# Patient Record
Sex: FEMALE | Race: AMERICAN INDIAN OR ALASKA NATIVE
[De-identification: names, ages, dates, MRNs, and addresses within clinical notes are randomized per-mention and may not be internally consistent; named-entity substitution may affect disease eponyms.]

---

## 2017-01-07 ENCOUNTER — HOSPITAL ENCOUNTER (EMERGENCY)
Dept: HOSPITAL 5 - ED | Age: 82
Discharge: HOME | End: 2017-01-07
Payer: MEDICARE

## 2017-01-07 VITALS — SYSTOLIC BLOOD PRESSURE: 122 MMHG | DIASTOLIC BLOOD PRESSURE: 63 MMHG

## 2017-01-07 DIAGNOSIS — I10: ICD-10-CM

## 2017-01-07 DIAGNOSIS — H40.9: ICD-10-CM

## 2017-01-07 DIAGNOSIS — Z88.6: ICD-10-CM

## 2017-01-07 DIAGNOSIS — M19.90: ICD-10-CM

## 2017-01-07 DIAGNOSIS — N28.9: ICD-10-CM

## 2017-01-07 DIAGNOSIS — H66.91: Primary | ICD-10-CM

## 2017-01-07 PROCEDURE — 99283 EMERGENCY DEPT VISIT LOW MDM: CPT

## 2017-01-07 NOTE — EMERGENCY DEPARTMENT REPORT
ED ENT HPI





- General


Chief complaint: Dental/Oral


Stated complaint: R EAR PAIN


Time Seen by Provider: 17 07:20


Source: patient, EMS


Mode of arrival: Stretcher


Limitations: No Limitations





- History of Present Illness


Initial comments: 


84F multiple medical problems hypertension, anxiety presents with complaint of 

right ear pain since yesterday.  Patient complaining of sensation of pressure 

in the ear.  Denies any fever chills nausea or vomiting.  Because patient is 

elderly and stated she could not get a ride she called EMS to come to hospital 

for treatment.  States she has had ear infections and issues with her TMJ joint 

in the past.  States she has not put anything inside of her ear lately.


MD complaint: ear pain


Onset/Timin


-: days(s)


Location: R ear


Severity: moderate


Severity scale (0 -10): 7


Quality: aching





- Related Data


 Home Medications











 Medication  Instructions  Recorded  Confirmed  Last Taken


 


ALPRAZolam [Xanax TAB] 1 mg PO BID 03/04/15 08/21/16 Unknown


 


Latanoprost 0.005% [Xalatan 0.005%] 1 drop OP QPM 03/04/15 08/21/16 Unknown


 


Levothyroxine [Synthroid] 125 mcg PO QAM 03/04/15 08/21/16 Unknown


 


Lisinopril [Zestril] 10 mg PO QDAY 03/04/15 08/21/16 Unknown


 


Pantoprazole [Protonix] 40 mg PO QDAY 03/04/15 08/21/16 Unknown


 


Sennosides [Senna Laxative] 8.6 mg PO QDAY 03/04/15 08/21/16 Unknown


 


Simvastatin 20 mg PO QHS 03/04/15 08/21/16 Unknown


 


traMADol [Ultram] 50 mg PO Q8HR PRN 03/04/15 08/21/16 Unknown


 


Baclofen [Lioresal] 10 mg PO BID 16





    1000


 


HYDROcodone/APAP  [Norco 1 each PO Q6HR PRN 16 10:

00





10/325]    








 Previous Rx's











 Medication  Instructions  Recorded  Last Taken  Type


 


Ketorolac [Toradol] 10 mg PO Q6H PRN #12 tablet 03/04/15 Unknown Rx


 


Meclizine [Antivert] 25 mg PO TID PRN #30 tablet 03/04/15 Unknown Rx


 


Acetaminophen [Acetaminophen TAB] 500 mg PO Q6HR PRN #25 tablet 17 

Unknown Rx


 


Amoxicillin [Amoxicillin TAB] 875 mg PO BID #14 tablet 17 Unknown Rx


 


Neomy/Polymyx B/Hc (Otic) Soln 4 drops OTIC TID #1 bottle 17 Unknown Rx





[Cortisporin (Otic) Soln]    


 


traMADol [Ultram] 50 mg PO Q6HR PRN #10 tablet 17 Unknown Rx











 Allergies











Allergy/AdvReac Type Severity Reaction Status Date / Time


 


codeine Allergy  Vomiting Verified 10/25/14 05:57














ED Dental HPI





- General


Chief complaint: Dental/Oral


Stated complaint: R EAR PAIN


Time Seen by Provider: 17 07:20


Source: patient, EMS


Mode of arrival: Stretcher


Limitations: No Limitations





- Related Data


 Home Medications











 Medication  Instructions  Recorded  Confirmed  Last Taken


 


ALPRAZolam [Xanax TAB] 1 mg PO BID 03/04/15 08/21/16 Unknown


 


Latanoprost 0.005% [Xalatan 0.005%] 1 drop OP QPM 03/04/15 08/21/16 Unknown


 


Levothyroxine [Synthroid] 125 mcg PO QAM 03/04/15 08/21/16 Unknown


 


Lisinopril [Zestril] 10 mg PO QDAY 03/04/15 08/21/16 Unknown


 


Pantoprazole [Protonix] 40 mg PO QDAY 03/04/15 08/21/16 Unknown


 


Sennosides [Senna Laxative] 8.6 mg PO QDAY 03/04/15 08/21/16 Unknown


 


Simvastatin 20 mg PO QHS 03/04/15 08/21/16 Unknown


 


traMADol [Ultram] 50 mg PO Q8HR PRN 03/04/15 08/21/16 Unknown


 


Baclofen [Lioresal] 10 mg PO BID 16





    1000


 


HYDROcodone/APAP  [Norco 1 each PO Q6HR PRN 16 10:

00





10/325]    








 Previous Rx's











 Medication  Instructions  Recorded  Last Taken  Type


 


Ketorolac [Toradol] 10 mg PO Q6H PRN #12 tablet 03/04/15 Unknown Rx


 


Meclizine [Antivert] 25 mg PO TID PRN #30 tablet 03/04/15 Unknown Rx


 


Acetaminophen [Acetaminophen TAB] 500 mg PO Q6HR PRN #25 tablet 17 

Unknown Rx


 


Amoxicillin [Amoxicillin TAB] 875 mg PO BID #14 tablet 17 Unknown Rx


 


Neomy/Polymyx B/Hc (Otic) Soln 4 drops OTIC TID #1 bottle 17 Unknown Rx





[Cortisporin (Otic) Soln]    


 


traMADol [Ultram] 50 mg PO Q6HR PRN #10 tablet 17 Unknown Rx











 Allergies











Allergy/AdvReac Type Severity Reaction Status Date / Time


 


codeine Allergy  Vomiting Verified 10/25/14 05:57














ED Review of Systems


ROS: 


Stated complaint: R EAR PAIN


Other details as noted in HPI





ENT: ear pain (2 days.  Pain)





ED Past Medical Hx





- Past Medical History


Previous Medical History?: Yes


Hx Hypertension: Yes


Hx Renal Disease: Yes (renal insufficiency)


Hx Arthritis: Yes


Additional medical history: glucoma.  TMJ pain





- Surgical History


Past Surgical History?: Yes


Additional Surgical History: HIATAL HERNIA, BILATERAL KNEE SURGERY





- Social History


Smoking Status: Never Smoker


Substance Use Type: None





- Medications


Home Medications: 


 Home Medications











 Medication  Instructions  Recorded  Confirmed  Last Taken  Type


 


ALPRAZolam [Xanax TAB] 1 mg PO BID 03/04/15 08/21/16 Unknown History


 


Ketorolac [Toradol] 10 mg PO Q6H PRN #12 tablet 03/04/15 08/21/16 Unknown Rx


 


Latanoprost 0.005% [Xalatan 0.005%] 1 drop OP QPM 03/04/15 08/21/16 Unknown 

History


 


Levothyroxine [Synthroid] 125 mcg PO QAM 03/04/15 08/21/16 Unknown History


 


Lisinopril [Zestril] 10 mg PO QDAY 03/04/15 08/21/16 Unknown History


 


Meclizine [Antivert] 25 mg PO TID PRN #30 tablet 03/04/15 08/21/16 Unknown Rx


 


Pantoprazole [Protonix] 40 mg PO QDAY 03/04/15 08/21/16 Unknown History


 


Sennosides [Senna Laxative] 8.6 mg PO QDAY 03/04/15 08/21/16 Unknown History


 


Simvastatin 20 mg PO QHS 03/04/15 08/21/16 Unknown History


 


traMADol [Ultram] 50 mg PO Q8HR PRN 03/04/15 08/21/16 Unknown History


 


Baclofen [Lioresal] 10 mg PO BID 16 History





    1000 


 


HYDROcodone/APAP  [Norco 1 each PO Q6HR PRN 16 10:

00 History





10/325]     


 


Acetaminophen [Acetaminophen TAB] 500 mg PO Q6HR PRN #25 tablet 17  

Unknown Rx


 


Amoxicillin [Amoxicillin TAB] 875 mg PO BID #14 tablet 17  Unknown Rx


 


Neomy/Polymyx B/Hc (Otic) Soln 4 drops OTIC TID #1 bottle 17  Unknown Rx





[Cortisporin (Otic) Soln]     


 


traMADol [Ultram] 50 mg PO Q6HR PRN #10 tablet 17  Unknown Rx














ED Physical Exam





- General


Limitations: No Limitations


General appearance: alert, in no apparent distress





- Head


Head exam: Present: atraumatic, normocephalic





- Eye


Eye exam: Present: normal appearance, PERRL, EOMI





- ENT


ENT exam: Present: mucous membranes moist, other (right tympanic membrane 

bulging, injected, no visible effusion behind it.  Tympanic membrane intact and 

no perforations, no foreign bodies minor amount of dried wax in the canal, 

minor external canal erythema.  No pus.  Hearing intact, no reproducible TMJ 

tenderness on clinical palpation.  No mastoid tenderness bilaterally on deep 

palpation either)





- Neck


Neck exam: Present: normal inspection





- Respiratory


Respiratory exam: Present: normal lung sounds bilaterally.  Absent: respiratory 

distress





- Cardiovascular


Cardiovascular Exam: Present: regular rate, normal rhythm.  Absent: systolic 

murmur, diastolic murmur, rubs, gallop





- GI/Abdominal


GI/Abdominal exam: Present: soft, normal bowel sounds





- Extremities Exam


Extremities exam: Present: normal inspection





- Back Exam


Back exam: Present: normal inspection





- Neurological Exam


Neurological exam: Present: alert, oriented X3





- Psychiatric


Psychiatric exam: Present: normal affect, normal mood





- Skin


Skin exam: Present: warm, dry, intact, normal color.  Absent: rash





ED Course


 Vital Signs











  17





  06:47


 


Temperature 98.1 F


 


Pulse Rate 97 H


 


Respiratory 20





Rate 


 


Blood Pressure 122/63





[Left] 


 


O2 Sat by Pulse 97





Oximetry 














ED Medical Decision Making





- Medical Decision Making


A/P: Acute otitis media right ear


1-Tylenol 500 mg when necessary, Ultram for severe pain PRN, amoxicillin 875 

twice a day 7days


2- for relief Corticosporin eardrops


3- follow-up with primary care doctor


 


Critical care attestation.: 


If time is entered above; I have spent that time in minutes in the direct care 

of this critically ill patient, excluding procedure time.








ED Disposition


Clinical Impression: 


 Earache on right





Acute otitis media


Qualifiers:


 Laterality: right Recurrence: recurrent Spontaneous tympanic membrane rupture: 

without spontaneous rupture 


Disposition: DISCHARGED TO HOME OR SELFCARE


Is pt being admited?: No


Does the pt Need Aspirin: No


Condition: Stable


Instructions:  Earache (ED), Anesthetics (Into the ear), Otitis Media (ED)


Prescriptions: 


Acetaminophen [Acetaminophen TAB] 500 mg PO Q6HR PRN #25 tablet


 PRN Reason: Pain


Amoxicillin [Amoxicillin TAB] 875 mg PO BID #14 tablet


Neomy/Polymyx B/Hc (Otic) Soln [Cortisporin (Otic) Soln] 4 drops OTIC TID #1 

bottle


traMADol [Ultram] 50 mg PO Q6HR PRN #10 tablet


 PRN Reason: Pain


Referrals: 


PRIMARY CARE,MD [Primary Care Provider] - 3-5 Days


WILLIAM HELLER MD [Staff Physician] - 3-5 Days


Time of Disposition: 07:48

## 2017-01-09 ENCOUNTER — HOSPITAL ENCOUNTER (EMERGENCY)
Dept: HOSPITAL 5 - ED | Age: 82
Discharge: HOME | End: 2017-01-09
Payer: MEDICARE

## 2017-01-09 VITALS — SYSTOLIC BLOOD PRESSURE: 153 MMHG | DIASTOLIC BLOOD PRESSURE: 74 MMHG

## 2017-01-09 DIAGNOSIS — H92.01: Primary | ICD-10-CM

## 2017-01-09 DIAGNOSIS — I10: ICD-10-CM

## 2017-01-09 DIAGNOSIS — N28.9: ICD-10-CM

## 2017-01-09 DIAGNOSIS — M19.90: ICD-10-CM

## 2017-01-09 PROCEDURE — 99282 EMERGENCY DEPT VISIT SF MDM: CPT

## 2017-01-09 PROCEDURE — 96372 THER/PROPH/DIAG INJ SC/IM: CPT

## 2017-01-09 NOTE — EMERGENCY DEPARTMENT REPORT
HPI





- General


Chief Complaint: Earache


Time Seen by Provider: 01/09/17 01:58





- HPI


HPI: 





84 year old femalewith  multiple medical problems hypertension, anxiety 

presents with complaint of right ear pain x 3 days.  Patient complaining of 

sensation of pressure in the ear.  Denies any fever chills nausea or vomiting.  

Patient was seen here yesterday and was prescribed Tramadol, Amoxicillin and 

Neomycin/Polymyx B otic drops. Patient denies symptomatic relief. States she 

has had ear infections and issues with her TMJ joint in the past.  States she 

is compliant with antibiotic use. Denies fever, chills, nausea, vomiting, chest 

pain, shortness of breath, abdominal pain.





ED Past Medical Hx





- Past Medical History


Previous Medical History?: Yes


Hx Hypertension: Yes


Hx Renal Disease: Yes (renal insufficiency)


Hx Arthritis: Yes


Additional medical history: glucoma.  TMJ pain





- Surgical History


Past Surgical History?: Yes


Additional Surgical History: HIATAL HERNIA, BILATERAL KNEE SURGERY





- Social History


Smoking Status: Never Smoker


Substance Use Type: None





- Medications


Home Medications: 


 Home Medications











 Medication  Instructions  Recorded  Confirmed  Last Taken  Type


 


ALPRAZolam [Xanax TAB] 1 mg PO BID 03/04/15 01/09/17 Unknown History


 


Latanoprost 0.005% [Xalatan 0.005%] 1 drop OP QPM 03/04/15 01/09/17 Unknown 

History


 


Levothyroxine [Synthroid] 125 mcg PO QAM 03/04/15 01/09/17 Unknown History


 


Pantoprazole [Protonix] 40 mg PO QDAY 03/04/15 01/09/17 Unknown History


 


Sennosides [Senna Laxative] 8.6 mg PO QDAY 03/04/15 01/09/17 Unknown History


 


Simvastatin 20 mg PO QHS 03/04/15 01/09/17 Unknown History


 


traMADol [Ultram] 50 mg PO Q8HR PRN 03/04/15 01/09/17 Unknown History


 


HYDROcodone/APAP  [Norco 1 each PO Q6HR PRN 08/21/16 01/09/17 08/20/16 10:

00 History





10/325]     


 


Acetaminophen [Acetaminophen TAB] 500 mg PO Q6HR PRN #25 tablet 01/07/17 01/09/ 17 Unknown Rx


 


Ketorolac [Toradol] 10 mg PO Q6H PRN #20 tablet 01/09/17  Unknown Rx


 


Losartan [Cozaar] 12.5 mg PO QDAY 01/09/17 01/09/17 Unknown History














ED Review of Systems


ROS: 


Stated complaint: RT EAR PAIN


Other details as noted in HPI





Constitutional: denies: chills, fever, malaise


Eyes: denies: eye pain


ENT: ear pain.  denies: throat pain, congestion


Respiratory: denies: cough, shortness of breath, wheezing


Cardiovascular: denies: chest pain, palpitations


Endocrine: no symptoms reported


Gastrointestinal: denies: abdominal pain, nausea, vomiting


Skin: denies: rash


Neurological: denies: headache, weakness, numbness, paresthesias





Physical Exam





- Physical Exam


Vital Signs: 


 Vital Signs











  01/09/17 01/09/17





  00:45 02:19


 


Temperature 98.3 F 


 


Pulse Rate 97 H 


 


Respiratory 20 20





Rate  


 


Blood Pressure 153/74 


 


Blood Pressure 153/74 





[Right]  


 


O2 Sat by Pulse 98 





Oximetry  











Physical Exam: 





GENERAL: The patient is well-developed and well-nourished. Patient is in NAD. 





HEAD: Normocephalic.  Atraumatic.  No tenderness to palpation of the TM joint.


EYES:  PERRL.


EARS: Left External auditory canals and tympanic membranes clear.  Mildly 

erythematous right external auditory canal with clear tympanic membrane. 

Negative tug test.  No tenderness to palpation of the mastoid region.


NOSE:  Normal nasal mucosa with no nasal discharge.


THROAT: No erythema, swelling or exudates.  





NECK: Supple, nontender, without lymphadenopathy.  





CHEST/LUNGS: Clear to auscultation throughout. 





HEART/CARDIOVASCULAR: Regular rate and rhythm.  No murmurs, rubs or gallops.





ABDOMEN: Abdomen is soft, nontender. Bowel sounds normoactive. No guarding or 

rebound tenderness. 





EXTREMITIES: Peripheral pulses intact. Capillary refill less than 2 seconds. 








ED Course


 Vital Signs











  01/09/17 01/09/17





  00:45 02:19


 


Temperature 98.3 F 


 


Pulse Rate 97 H 


 


Respiratory 20 20





Rate  


 


Blood Pressure 153/74 


 


Blood Pressure 153/74 





[Right]  


 


O2 Sat by Pulse 98 





Oximetry  














ED Medical Decision Making





- Lab Data





 Vital Signs











  01/09/17 01/09/17





  00:45 02:19


 


Temperature 98.3 F 


 


Pulse Rate 97 H 


 


Respiratory 20 20





Rate  


 


Blood Pressure 153/74 


 


Blood Pressure 153/74 





[Right]  


 


O2 Sat by Pulse 98 





Oximetry  














- Medical Decision Making





84-year-old female presents today with ear pain 3 days.  Patient was seen here 

yesterday and states her antibiotics are not working.  Explained to patient 

that the medication takes time and she is recommended to continue her 

antibiotic use.  Patient was given Toradol shot and reported symptomatic 

relief. Patient is in no acute distress at this time.  She will be discharged 

home and is encouraged to follow up with a primary care provider.  She will be 

sent home on Toradol and is encouraged to return to the emergency room for any 

worsening symptoms. 


Critical care attestation.: 


If time is entered above; I have spent that time in minutes in the direct care 

of this critically ill patient, excluding procedure time.








ED Disposition


Clinical Impression: 


 Earache on right


Disposition: DISCHARGED TO HOME OR SELFCARE


Is pt being admited?: No


Does the pt Need Aspirin: No


Condition: Stable


Instructions:  Earache (ED), Otitis Externa (ED), Otitis Media (ED)


Additional Instructions: 


Follow-up with primary care provider.  Return to the emergency department if 

symptoms worsen.


Prescriptions: 


Ketorolac [Toradol] 10 mg PO Q6H PRN #20 tablet


 PRN Reason: Pain


Referrals: 


PRIMARY CARE,MD [Primary Care Provider] - 3-5 Days


WILLIAM HELLER MD [Staff Physician] - 3-5 Days


Forms:  Work/School Release Form(ED)


Time of Disposition: 03:19

## 2017-05-11 ENCOUNTER — HOSPITAL ENCOUNTER (OUTPATIENT)
Dept: HOSPITAL 5 - OR | Age: 82
Discharge: HOME | End: 2017-05-11
Attending: OPHTHALMOLOGY
Payer: MEDICARE

## 2017-05-11 VITALS — SYSTOLIC BLOOD PRESSURE: 148 MMHG | DIASTOLIC BLOOD PRESSURE: 75 MMHG

## 2017-05-11 DIAGNOSIS — H26.9: Primary | ICD-10-CM

## 2017-05-11 DIAGNOSIS — M19.90: ICD-10-CM

## 2017-05-11 DIAGNOSIS — H40.9: ICD-10-CM

## 2017-05-11 DIAGNOSIS — F41.9: ICD-10-CM

## 2017-05-11 DIAGNOSIS — I10: ICD-10-CM

## 2017-05-11 PROCEDURE — 66984 XCAPSL CTRC RMVL W/O ECP: CPT

## 2017-05-11 PROCEDURE — V2632 POST CHMBR INTRAOCULAR LENS: HCPCS

## 2017-05-11 RX ADMIN — TROPICAMIDE SCH DROPS: 10 SOLUTION/ DROPS OPHTHALMIC at 07:35

## 2017-05-11 RX ADMIN — MOXIFLOXACIN SCH DROPS: 5 SOLUTION/ DROPS OPHTHALMIC at 07:30

## 2017-05-11 RX ADMIN — PREDNISOLONE ACETATE SCH DROPS: 10 SUSPENSION/ DROPS OPHTHALMIC at 10:20

## 2017-05-11 RX ADMIN — TROPICAMIDE SCH DROPS: 10 SOLUTION/ DROPS OPHTHALMIC at 07:30

## 2017-05-11 RX ADMIN — PHENYLEPHRINE HYDROCHLORIDE SCH DROPS: 25 SOLUTION/ DROPS OPHTHALMIC at 07:40

## 2017-05-11 RX ADMIN — MOXIFLOXACIN SCH DROPS: 5 SOLUTION/ DROPS OPHTHALMIC at 07:40

## 2017-05-11 RX ADMIN — PHENYLEPHRINE HYDROCHLORIDE SCH DROPS: 25 SOLUTION/ DROPS OPHTHALMIC at 07:30

## 2017-05-11 RX ADMIN — MOXIFLOXACIN SCH DROPS: 5 SOLUTION/ DROPS OPHTHALMIC at 07:35

## 2017-05-11 RX ADMIN — TROPICAMIDE SCH DROPS: 10 SOLUTION/ DROPS OPHTHALMIC at 07:40

## 2017-05-11 RX ADMIN — PREDNISOLONE ACETATE SCH DROPS: 10 SUSPENSION/ DROPS OPHTHALMIC at 09:30

## 2017-05-11 RX ADMIN — PHENYLEPHRINE HYDROCHLORIDE SCH DROPS: 25 SOLUTION/ DROPS OPHTHALMIC at 07:35

## 2017-05-11 NOTE — SHORT STAY SUMMARY
Short Stay Documentation


Date of service: 05/11/17





- History


H&P: obtained from office





- Allergies and Medications


Current Medications: 


 Allergies





codeine Adverse Reaction (Verified 05/09/17 15:23)


 Vomiting





 Home Medications











 Medication  Instructions  Recorded  Confirmed  Last Taken  Type


 


ALPRAZolam [Xanax TAB] 1 mg PO BID 03/04/15 01/09/17 Unknown History


 


Latanoprost 0.005% [Xalatan 0.005%] 1 drop OP QPM 03/04/15 01/09/17 Unknown 

History


 


Levothyroxine [Synthroid] 125 mcg PO QAM 03/04/15 01/09/17 Unknown History


 


Pantoprazole [Protonix] 40 mg PO QDAY 03/04/15 01/09/17 Unknown History


 


Sennosides [Senna Laxative] 8.6 mg PO QDAY 03/04/15 01/09/17 Unknown History


 


Simvastatin 20 mg PO QHS 03/04/15 01/09/17 Unknown History


 


HYDROcodone/APAP  [Norco 1 each PO Q6HR PRN 08/21/16 01/09/17 08/20/16 10:

00 History





10/325]     


 


Acetaminophen [Acetaminophen TAB] 500 mg PO Q6HR PRN #25 tablet 01/07/17 01/09/ 17 Unknown Rx


 


Ketorolac [Toradol] 10 mg PO Q6H PRN #20 tablet 01/09/17  Unknown Rx


 


Losartan [Cozaar] 12.5 mg PO QDAY 01/09/17 01/09/17 Unknown History


 


Ondansetron [Zofran Odt] 4 mg PO Q8HR #20 tab.rapdis 02/16/17  Unknown Rx


 


traMADol [Ultram 50 MG tab] 50 mg PO Q8HR PRN #20 tablet 02/16/17  Unknown Rx








Active Medications





Moxifloxacin HCl (Vigamox)  1 drops OD Q5MIN [facility]


   Stop: 05/11/17 18:00


   Last Admin: 05/11/17 07:40 Dose:  1 drops


Phenylephrine HCl (Ak-Dilate)  1 drops OD Q5MIN [facility]


   Stop: 05/11/17 18:00


   Last Admin: 05/11/17 07:40 Dose:  1 drops


Prednisolone Acetate (Pred Forte 1%)  1 drops OD QID [facility]ESS[facility]


Tetracaine HCl (Tetracaine 0.5%)  1 drops OD Q5M PRN


   PRN Reason: Analgesia


   Last Admin: 05/11/17 07:30 Dose:  1 drops


Tropicamide (Mydriacyl)  1 drops OD Q5MIN YESSEN[facility]


   Stop: 05/11/17 18:00


   Last Admin: 05/11/17 07:40 Dose:  1 drops











- Brief post op/procedure progress note


Date of procedure: 05/11/17


Pre-op diagnosis: right cataract


Post-op diagnosis: same


Procedure: 





Physical musculature with intraocular lens insertion right eye


Anesthesia: MAC


Surgeon: NHUNG MAKI


Estimated blood loss: none


Pathology: none


Condition: stable





- Disposition


Condition at discharge: Good


Disposition: DISCHARGED TO HOME OR SELFCARE





- Discharge Diagnoses


(1) Cataract


Status: Resolved   


Qualifiers: 


   Cataract type: age-related   Age-related cataract type: combined forms   

Infantile/juvenile cataract type: I   Traumatic cataract type: T   Complicated 

cataract type: C   Secondary cataract type: S   Laterality: right   Qualified 

Code(s): H25.811 - Combined forms of age-related cataract, right eye   





Short Stay Discharge Plan


Follow up with: 


PRIMARY CARE,MD [Primary Care Provider] - 7 Days

## 2017-05-11 NOTE — ANESTHESIA CONSULTATION
Anesthesia Consult and Med Hx


Date of service: 05/11/17





- Airway


Anesthetic Teeth Evaluation: Good, Dentures (upper and lower)


ROM Head & Neck: Adequate


Mental/Hyoid Distance: Adequate


Mallampati Class: Class II


Intubation Access Assessment: Probably Good





- Pulmonary Exam


CTA: Yes





- Cardiac Exam


Cardiac Exam: RRR





- Pre-Operative Health Status


ASA Pre-Surgery Classification: ASA3


Proposed Anesthetic Plan: MAC





- Cardiovascular System


Hx Hypertension: Yes (x 10 yrs)





- Central Nervous System


Hx Psychiatric Problems: Yes





- Endocrine


Hx Renal Disease: Yes (renal insufficiency)





- Other Systems


Hx Cancer: No

## 2017-05-11 NOTE — OPERATIVE REPORT
Operative Report


Operative Report: 








   














PATIENT'S NAME:   





DATE OF BIRTH:   





DATE OF SURGERY: 05/11/2017      





PREOPERATIVE DIAGNOSIS:   Cataract  right eye





   


POSTOPERATIVE DIAGNOSIS:    Same 





OPERATIVE PROCEDURE:   Phacoemulsification with intraocular lens implantation, 

right     eye





SURGEON:   Cindy Donovan M.D.





ASSISTANT SURGEON: None   





Lens:  [id number] 22.0 D


 


ANESTHESIA:   Monitored anesthesia care in combination with topical and 

intracameral anesthesia because of the established specific risk of reflux, 

arrhythmias, or anxiety attacks associated with ocular manipulation, as well as 

the difficulty of the ophthalmologist to manage such potentially catastrophic 

events while simultaneously attempting to complete the surgical procedure and 

was deemed necessary for the patient's safety to have an Anesthetist present 

during the procedure whenever possible. An Anesthetist was utilized to regulate 

the intravenous sedation of the patient so the patient was cooperative yet not 

asleep in order for the patient to successfully maintain fixation of the eye on 

the operating light of the microscope.





COMPLICATIONS:   No surgical complications  No blood loss.





ALLERGIES:   Codeine





PROGNOSIS:  Excellent





INDICATIONS FOR SURGERY:  The patient is undergoing surgery in the hopes of 

eliminating or improving these visual difficulties.





PROCEDURE:    After arriving at the surgery center, the patient was given 

topical anesthetic and dilating drops, as noted in the record. The patient was 

then taken into the operating room and given more anesthetic drops.  The eyelids

, lashes, and lid margins were scrubbed with Betadine solution, and the patient 

was draped.  The Nurse Anesthetist administered IV sedation and monitored the 

patient during the procedure.





The eye was then fixated with a 0.12, and a stab incision was made in the 

peripheral clear cornea into the anterior chamber. This was made on my left 

side.  Viscoelastic was next used to fill the anterior chamber.  The eye was 

once again fixated with the 0.12 forceps and a keratome was used make an 

incision in clear cornea peripherally on my right hand side temporally.





The capsule forceps were used to open the central anterior capsule and then 

make a continuous round capsulotomy.


Hydrodissection was carried out utilizing a cannula and balanced salt solution 

to delineate the cortical material from the capsule and the nucleus from the 

cortical material.


The phaco tip was introduced into the eye and used to remove the anterior 

cortical material in the area of the capsulotomy.  Then the phaco tip was 

buried into the nucleus, and a chopping instrument was introduced into the eye 

and used to provide countertraction in the nucleus between this instrument and 

the phaco tip fracturing the nucleus.  This procedure was repeated multiple 

times, providing multiple small segments of the lens, and then the phaco tip 

was used to remove each of these segments.


An I/A tip was then used to remove the remaining cortex.  


The anterior chamber was refilled with viscoelastic.  An one-piece, acrylic 

intraocular lens was then placed into an inserting cartridge.  The tip of the 

inserting cartridge was introduced into the keratome incision and into the 

anterior chamber.  The implant was gently advanced through the cartridge and 

into the eye, where it unfolded, and both haptics were placed in the capsular 

bag, where it centered nicely and appeared to be well fixated.  





After placement of the intraocular lens, the I~and~A handpiece was placed back 

into the eye and used to remove the viscoelastic, including viscoelastic that 

was behind the optic of the intraocular lens.  The anterior chamber was then 

filled with balanced salt solution, and hydration of the wound was used to 

cause swelling of the wound and more appropriate watertight closure.  When the 

wound was found to be firm, the patient was asked to comment on how bright the 

light was.  If there was no light perception at all or if the light was 

substantially dimmer than during the rest of the surgery, the amount of fluid 

in the eye was decompressed to lower the intraocular pressure until the patient 

could see the bright light again.  This was done to avoid any damage or 

decreased blood flow to the optic nerve.











MEDICATIONS APPLIED AT END OF SURGERY:  One drop of Pred Forte and Vigamox





The patient was given a shield to wear at night and was instructed not to rub 

or push on the eye.





DISCHARGE SUMMARY:  The patient was released in stable condition.  The patient 

and those with the patient were given a written sheet of postoperative 

instructions and counseling on any abnormal laboratory studies.  The patient is 

to see us tomorrow for follow-up in the office and is to call immediately for 

any difficulties.  











_____________________________          ________________________________


Cindy Donovan M.D.                            Date

## 2017-08-23 ENCOUNTER — HOSPITAL ENCOUNTER (EMERGENCY)
Dept: HOSPITAL 5 - ED | Age: 82
Discharge: HOME | End: 2017-08-23
Payer: MEDICARE

## 2017-08-23 VITALS — SYSTOLIC BLOOD PRESSURE: 130 MMHG | DIASTOLIC BLOOD PRESSURE: 87 MMHG

## 2017-08-23 DIAGNOSIS — M19.90: ICD-10-CM

## 2017-08-23 DIAGNOSIS — Z88.6: ICD-10-CM

## 2017-08-23 DIAGNOSIS — M26.603: Primary | ICD-10-CM

## 2017-08-23 DIAGNOSIS — I12.9: ICD-10-CM

## 2017-08-23 DIAGNOSIS — Z88.8: ICD-10-CM

## 2017-08-23 DIAGNOSIS — N18.9: ICD-10-CM

## 2017-08-23 DIAGNOSIS — G43.909: ICD-10-CM

## 2017-08-23 DIAGNOSIS — F41.9: ICD-10-CM

## 2017-08-23 PROCEDURE — 99283 EMERGENCY DEPT VISIT LOW MDM: CPT

## 2017-08-23 PROCEDURE — 96372 THER/PROPH/DIAG INJ SC/IM: CPT

## 2017-08-23 NOTE — EMERGENCY DEPARTMENT REPORT
ED General Adult HPI





- General


Chief complaint: Pain General


Stated complaint: TMJ FACIAL PAIN


Time Seen by Provider: 08/23/17 16:16


Source: EMS


Mode of arrival: Stretcher


Limitations: No Limitations





- History of Present Illness


Initial comments: 





Patient is a 84-year-old female here with complaint of bilateral jaw pain.  

Patient is known history of TMJ and is having significant pain.  She has a S2 

is following her for this and is created a brace for her but she is still 

having worsening pain.  No fevers chills nausea vomiting.  She states she ran 

out of her pain medications.


-: Gradual


Location: head, face


Radiation: neck


Severity scale (0 -10): 10


Quality: aching, sharp


Consistency: constant


Associated Symptoms: denies: denies other symptoms, confusion, chest pain, cough

, headaches, malaise, rash, shortness of breath, syncope





- Related Data


 Home Medications











 Medication  Instructions  Recorded  Confirmed  Last Taken


 


ALPRAZolam [Xanax TAB] 1 mg PO BID 03/04/15 08/23/17 05/11/17 02:00


 


Latanoprost 0.005% [Xalatan 0.005%] 1 drop OP QPM 03/04/15 08/23/17 05/10/17


 


Levothyroxine [Synthroid] 150 mcg PO QAM 03/04/15 08/23/17 05/10/17


 


Sennosides [Senna Laxative] 8.6 mg PO QDAY 03/04/15 08/23/17 05/10/17


 


Simvastatin 20 mg PO QHS 03/04/15 08/23/17 05/10/17


 


Losartan [Cozaar] 12.5 mg PO QDAY 01/09/17 08/23/17 05/11/17 02:00








 Previous Rx's











 Medication  Instructions  Recorded  Last Taken  Type


 


Acetaminophen [Acetaminophen TAB] 500 mg PO Q6HR PRN #25 tablet 01/07/17 

Unknown Rx


 


traMADol [Ultram 50 MG tab] 50 mg PO Q8HR PRN #20 tablet 08/23/17 Unknown Rx











 Allergies











Allergy/AdvReac Type Severity Reaction Status Date / Time


 


codeine AdvReac  Vomiting Verified 05/09/17 15:23


 


NSAIDS (Non-Steroidal AdvReac  Nausea Verified 08/23/17 15:01





Anti-Inflamma     














ED Review of Systems


ROS: 


Stated complaint: TMJ FACIAL PAIN


Other details as noted in HPI





Constitutional: denies: chills, fever


Eyes: denies: eye pain, eye discharge


ENT: dental pain.  denies: ear pain, throat pain, hearing loss, epistaxis, 

congestion


Respiratory: denies: see HPI, cough


Cardiovascular: denies: chest pain, palpitations





ED Past Medical Hx





- Past Medical History


Hx Hypertension: Yes (x 10 yrs)


Hx Renal Disease: Yes (renal insufficiency)


Hx Arthritis: Yes (right arm, previous caryl knees)


Hx Headaches / Migraines: Yes (migraines)


Hx HIV: No


Additional medical history: glucoma, detached retina left eye, elevated 

cholesterol.  TMJ pain,hypothyroidism





- Surgical History


Additional Surgical History: HIATAL HERNIA, BILATERAL KNEE SURGERY, 

thyroidectomy,right wrist surgery/FX





- Family History


Family history: no significant





- Social History


Smoking Status: Never Smoker


Substance Use Type: None





- Medications


Home Medications: 


 Home Medications











 Medication  Instructions  Recorded  Confirmed  Last Taken  Type


 


ALPRAZolam [Xanax TAB] 1 mg PO BID 03/04/15 08/23/17 05/11/17 02:00 History


 


Latanoprost 0.005% [Xalatan 0.005%] 1 drop OP QPM 03/04/15 08/23/17 05/10/17 

History


 


Levothyroxine [Synthroid] 150 mcg PO QAM 03/04/15 08/23/17 05/10/17 History


 


Sennosides [Senna Laxative] 8.6 mg PO QDAY 03/04/15 08/23/17 05/10/17 History


 


Simvastatin 20 mg PO QHS 03/04/15 08/23/17 05/10/17 History


 


Acetaminophen [Acetaminophen TAB] 500 mg PO Q6HR PRN #25 tablet 01/07/17 08/23/ 17 Unknown Rx


 


Losartan [Cozaar] 12.5 mg PO QDAY 01/09/17 08/23/17 05/11/17 02:00 History


 


traMADol [Ultram 50 MG tab] 50 mg PO Q8HR PRN #20 tablet 08/23/17  Unknown Rx














ED Physical Exam





- General


Limitations: No Limitations


General appearance: alert, anxious





- Head


Head exam: Present: atraumatic, normocephalic





- Eye


Eye exam: Present: normal appearance, PERRL, EOMI





- ENT


ENT exam: Present: normal exam, normal orophraynx





- Expanded ENT Exam


  ** Expanded


Mouth exam: Present: normal external inspection.  Absent: drooling, trismus, 

muffled voice, tongue normal, tongue elevation, laceration


Teeth exam: Present: normal inspection





- Neck


Neck exam: Absent: normal inspection, tenderness





- Respiratory


Respiratory exam: Absent: normal lung sounds bilaterally, respiratory distress





- Cardiovascular


Cardiovascular Exam: Present: regular rate, normal rhythm





- Extremities Exam


Extremities exam: Present: normal inspection, full ROM





ED Course





 Vital Signs











  08/23/17 08/23/17 08/23/17





  14:09 14:23 14:59


 


Temperature  99.9 F H 


 


Pulse Rate  100 H 


 


Respiratory 18 17 18





Rate   


 


Blood Pressure  160/98 





[Left]   


 


O2 Sat by Pulse 100 100 





Oximetry   














ED Medical Decision Making





- Medical Decision Making





Patient with TMJ pain.  Out of pain medications at home.  Given a dose of 

Toradol and morphine here in the emergency department with some relief.  Plan 

to discharge home on continued tramadol.


Critical care attestation.: 


If time is entered above; I have spent that time in minutes in the direct care 

of this critically ill patient, excluding procedure time.








ED Disposition


Clinical Impression: 


 Anxiety, TMJ (temporomandibular joint syndrome)





Disposition: DC-01 TO HOME OR SELFCARE


Is pt being admited?: No


Condition: Stable


Instructions:  Temporomandibular Disorder (ED)


Additional Instructions: 


Please follow up with your dentist to adjust your TMJ brace.


Prescriptions: 


traMADol [Ultram 50 MG tab] 50 mg PO Q8HR PRN #20 tablet


 PRN Reason: Pain


Referrals: 


PRIMARY CARE,MD [Primary Care Provider] - 3-5 Days

## 2017-12-25 ENCOUNTER — HOSPITAL ENCOUNTER (EMERGENCY)
Dept: HOSPITAL 5 - ED | Age: 82
Discharge: HOME | End: 2017-12-25
Payer: MEDICARE

## 2017-12-25 VITALS — DIASTOLIC BLOOD PRESSURE: 56 MMHG | SYSTOLIC BLOOD PRESSURE: 107 MMHG

## 2017-12-25 DIAGNOSIS — G43.909: ICD-10-CM

## 2017-12-25 DIAGNOSIS — Z88.8: ICD-10-CM

## 2017-12-25 DIAGNOSIS — E78.00: ICD-10-CM

## 2017-12-25 DIAGNOSIS — Z88.5: ICD-10-CM

## 2017-12-25 DIAGNOSIS — Z76.0: Primary | ICD-10-CM

## 2017-12-25 DIAGNOSIS — N28.9: ICD-10-CM

## 2017-12-25 DIAGNOSIS — M19.90: ICD-10-CM

## 2017-12-25 DIAGNOSIS — I10: ICD-10-CM

## 2017-12-25 DIAGNOSIS — E03.9: ICD-10-CM

## 2017-12-25 PROCEDURE — 99282 EMERGENCY DEPT VISIT SF MDM: CPT

## 2017-12-25 NOTE — EMERGENCY DEPARTMENT REPORT
ED Recheck HPI





- General


Chief Complaint: Pain General


Stated Complaint: JAW PAIN


Time Seen by Provider: 12/25/17 20:46


Source: patient


Mode of arrival: Ambulatory


Limitations: No Limitations





- History of Present Illness


Initial Comments: 





This is a 85-year-old female nontoxic, well nourished in appearance, no acute 

signs of distress presents to the ED for medication refill. Patient stated she 

takes Tramadol and Xanax for pain and stated that her PCP can not refill her 

medications and gave her a refill to pain management but due to the holidays 

patient stated patient was has not. Patient denies any new symptoms. Denies any 

chest pain, shortness of breathe, fever, chills, headache, nausea, vomiting, 

back pain. Patient states allergies to codeine but stated take Tramadol with no 

complications. 


MD Complaint: medication refill request


-: year(s)


Symptoms Since Prior Visit: no new symptoms


Associated Symptoms: none.  denies: fever, chills, chest pain, shortness of 

breath, rash, malaise, [place], abdominal pain





- Related Data


 Home Medications











 Medication  Instructions  Recorded  Confirmed  Last Taken


 


ALPRAZolam [Xanax TAB] 1 mg PO BID 03/04/15 08/23/17 05/11/17 02:00


 


Latanoprost 0.005% [Xalatan 0.005%] 1 drop OP QPM 03/04/15 08/23/17 05/10/17


 


Levothyroxine [Synthroid] 150 mcg PO QAM 03/04/15 08/23/17 05/10/17


 


Sennosides [Senna Laxative] 8.6 mg PO QDAY 03/04/15 08/23/17 05/10/17


 


Simvastatin 20 mg PO QHS 03/04/15 08/23/17 05/10/17


 


Losartan [Cozaar] 12.5 mg PO QDAY 01/09/17 08/23/17 05/11/17 02:00








 Previous Rx's











 Medication  Instructions  Recorded  Last Taken  Type


 


Acetaminophen [Acetaminophen TAB] 500 mg PO Q6HR PRN #25 tablet 01/07/17 

Unknown Rx


 


traMADol [Ultram 50 MG tab] 50 mg PO Q8HR PRN #20 tablet 08/23/17 Unknown Rx


 


ALPRAZolam [Xanax TAB] 1 mg PO BID PRN #4 tab 12/25/17 Unknown Rx


 


traMADol [Ultram 50 MG tab] 50 mg PO Q8H #6 tablet 12/25/17 Unknown Rx











 Allergies











Allergy/AdvReac Type Severity Reaction Status Date / Time


 


codeine AdvReac  Vomiting Verified 05/09/17 15:23


 


NSAIDS (Non-Steroidal AdvReac  Nausea Verified 08/23/17 15:01





Anti-Inflamma     














ED Review of Systems


ROS: 


Stated complaint: JAW PAIN


Other details as noted in HPI





Constitutional: denies: chills, fever


Eyes: denies: eye pain, eye discharge, vision change


ENT: denies: ear pain, throat pain


Respiratory: denies: cough, shortness of breath, wheezing


Cardiovascular: denies: chest pain, palpitations


Endocrine: no symptoms reported


Gastrointestinal: denies: abdominal pain, nausea, diarrhea


Genitourinary: denies: urgency, dysuria, discharge


Musculoskeletal: denies: back pain, joint swelling, arthralgia


Skin: denies: rash, lesions


Neurological: denies: headache, weakness, paresthesias


Psychiatric: denies: anxiety, depression


Hematological/Lymphatic: denies: easy bleeding, easy bruising





ED Past Medical Hx





- Past Medical History


Hx Hypertension: Yes (x 10 yrs)


Hx Renal Disease: Yes (renal insufficiency)


Hx Arthritis: Yes (right arm, previous caryl knees)


Hx Headaches / Migraines: Yes (migraines)


Hx HIV: No


Additional medical history: glucoma, detached retina left eye, elevated 

cholesterol.  TMJ pain,hypothyroidism





- Surgical History


Additional Surgical History: HIATAL HERNIA, BILATERAL KNEE SURGERY, 

thyroidectomy,right wrist surgery/FX





- Social History


Smoking Status: Never Smoker


Substance Use Type: None





- Medications


Home Medications: 


 Home Medications











 Medication  Instructions  Recorded  Confirmed  Last Taken  Type


 


ALPRAZolam [Xanax TAB] 1 mg PO BID 03/04/15 08/23/17 05/11/17 02:00 History


 


Latanoprost 0.005% [Xalatan 0.005%] 1 drop OP QPM 03/04/15 08/23/17 05/10/17 

History


 


Levothyroxine [Synthroid] 150 mcg PO QAM 03/04/15 08/23/17 05/10/17 History


 


Sennosides [Senna Laxative] 8.6 mg PO QDAY 03/04/15 08/23/17 05/10/17 History


 


Simvastatin 20 mg PO QHS 03/04/15 08/23/17 05/10/17 History


 


Acetaminophen [Acetaminophen TAB] 500 mg PO Q6HR PRN #25 tablet 01/07/17 08/23/ 17 Unknown Rx


 


Losartan [Cozaar] 12.5 mg PO QDAY 01/09/17 08/23/17 05/11/17 02:00 History


 


traMADol [Ultram 50 MG tab] 50 mg PO Q8HR PRN #20 tablet 08/23/17  Unknown Rx


 


ALPRAZolam [Xanax TAB] 1 mg PO BID PRN #4 tab 12/25/17  Unknown Rx


 


traMADol [Ultram 50 MG tab] 50 mg PO Q8H #6 tablet 12/25/17  Unknown Rx














ED Physical Exam





- General


Limitations: No Limitations


General appearance: alert, in no apparent distress





- Head


Head exam: Present: atraumatic, normocephalic





- Eye


Eye exam: Present: normal appearance





- ENT


ENT exam: Present: mucous membranes moist





- Neck


Neck exam: Present: normal inspection





- Respiratory


Respiratory exam: Present: normal lung sounds bilaterally.  Absent: respiratory 

distress





- Cardiovascular


Cardiovascular Exam: Present: regular rate, normal rhythm.  Absent: systolic 

murmur, diastolic murmur, rubs, gallop





- GI/Abdominal


GI/Abdominal exam: Present: soft, normal bowel sounds





- Extremities Exam


Extremities exam: Present: normal inspection





- Back Exam


Back exam: Present: normal inspection





- Neurological Exam


Neurological exam: Present: alert, oriented X3





- Psychiatric


Psychiatric exam: Present: normal affect, normal mood





- Skin


Skin exam: Present: warm, dry, intact, normal color.  Absent: rash





ED Course


 Vital Signs











  12/25/17 12/25/17





  20:36 21:31


 


Temperature 98.2 F 


 


Pulse Rate 102 H 


 


Respiratory 18 18





Rate  


 


Blood Pressure 107/56 


 


O2 Sat by Pulse 100 





Oximetry  














- Reevaluation(s)


Reevaluation #1: 





12/25/17 21:38


Patient is speaking in full sentences with no signs of distress noted.





- Consultations


Consultation #1: 





12/25/17 21:43


Dr. Nazario has been consulted about patient history, physical exam, and agrees 

to the discharge plan of care. 





ED Recheck MDM





- Medical Decision Making





This is a 85-year-old female that presents for medication refill. Patient is 

stable and was examiend by me. [place]  has been obtained and indicates 

patient takes Xanax 1mg daily and Toradol 50mg. Patient has been consulted with 

Dr. Nazario and agrees to the discharge plan of care. Patient received Xanax and 

Toradol for chronic use and not to have withdraws and was instructed not to 

operate any machinery while taking them. Patient was instructed and refereed to 

pain management and PCP. At time time of discharge, the patient does not seem 

toxic or ill in appearance.  No acute signs of distress noted.  Patient agrees 

to discharge treatment plan of care.  No further questions noted by the patient.


Critical care attestation.: 


If time is entered above; I have spent that time in minutes in the direct care 

of this critically ill patient, excluding procedure time.








ED Disposition


Clinical Impression: 


 Medication refill





Disposition: DC-01 TO HOME OR SELFCARE


Is pt being admited?: No


Does the pt Need Aspirin: No


Condition: Stable


Instructions:  Tramadol (By mouth), Alprazolam (By mouth)


Additional Instructions: 


Follow-up with a primary care doctor in 3-5 days or if symptoms worsen and 

continue return to emergency room as soon as possible. 


Do not operate any machinery while taking Xanax and Tramadol due to drowsiness





Prescriptions: 


ALPRAZolam [Xanax TAB] 1 mg PO BID PRN #4 tab


 PRN Reason: Anxiety


traMADol [Ultram 50 MG tab] 50 mg PO Q8H #6 tablet


Referrals: 


PRIMARY CARE,MD [Referring] - 3-5 Days


DEXTER TORREZ MD [Staff Physician] - 3-5 Days


[facility] [Outside] - 3-5 Days


[facility] [Outside] - 3-5 Days


ALAN MENDOZA MD [Referring] - 3-5 Days


BLAISE BALL MD [Staff Physician] - 3-5 Days

## 2017-12-28 ENCOUNTER — HOSPITAL ENCOUNTER (EMERGENCY)
Dept: HOSPITAL 5 - ED | Age: 82
Discharge: LEFT BEFORE DISCHARGE | End: 2017-12-28
Payer: MEDICARE

## 2017-12-28 VITALS — DIASTOLIC BLOOD PRESSURE: 40 MMHG | SYSTOLIC BLOOD PRESSURE: 127 MMHG

## 2017-12-28 DIAGNOSIS — Z53.21: Primary | ICD-10-CM

## 2018-06-12 ENCOUNTER — HOSPITAL ENCOUNTER (EMERGENCY)
Dept: HOSPITAL 5 - ED | Age: 83
Discharge: HOME | End: 2018-06-12
Payer: MEDICARE

## 2018-06-12 VITALS — SYSTOLIC BLOOD PRESSURE: 154 MMHG | DIASTOLIC BLOOD PRESSURE: 66 MMHG

## 2018-06-12 DIAGNOSIS — M19.90: ICD-10-CM

## 2018-06-12 DIAGNOSIS — E78.00: ICD-10-CM

## 2018-06-12 DIAGNOSIS — I10: ICD-10-CM

## 2018-06-12 DIAGNOSIS — E87.5: Primary | ICD-10-CM

## 2018-06-12 DIAGNOSIS — G43.909: ICD-10-CM

## 2018-06-12 DIAGNOSIS — Z90.89: ICD-10-CM

## 2018-06-12 LAB
BASOPHILS # (AUTO): 0 K/MM3 (ref 0–0.1)
BASOPHILS NFR BLD AUTO: 0.3 % (ref 0–1.8)
BILIRUB UR QL STRIP: (no result)
BLOOD UR QL VISUAL: (no result)
BUN SERPL-MCNC: 22 MG/DL (ref 7–17)
BUN/CREAT SERPL: 24 %
CALCIUM SERPL-MCNC: 7.8 MG/DL (ref 8.4–10.2)
EOSINOPHIL # BLD AUTO: 0.2 K/MM3 (ref 0–0.4)
EOSINOPHIL NFR BLD AUTO: 3.2 % (ref 0–4.3)
HCT VFR BLD CALC: 28.2 % (ref 30.3–42.9)
HEMOLYSIS INDEX: 4
HGB BLD-MCNC: 8.8 GM/DL (ref 10.1–14.3)
LYMPHOCYTES # BLD AUTO: 1.7 K/MM3 (ref 1.2–5.4)
LYMPHOCYTES NFR BLD AUTO: 34.2 % (ref 13.4–35)
MCH RBC QN AUTO: 24 PG (ref 28–32)
MCHC RBC AUTO-ENTMCNC: 31 % (ref 30–34)
MCV RBC AUTO: 78 FL (ref 79–97)
MONOCYTES # (AUTO): 0.5 K/MM3 (ref 0–0.8)
MONOCYTES % (AUTO): 10.6 % (ref 0–7.3)
PH UR STRIP: 8 [PH] (ref 5–7)
PLATELET # BLD: 291 K/MM3 (ref 140–440)
PROT UR STRIP-MCNC: (no result) MG/DL
RBC # BLD AUTO: 3.63 M/MM3 (ref 3.65–5.03)
RBC #/AREA URNS HPF: 1 /HPF (ref 0–6)
UROBILINOGEN UR-MCNC: < 2 MG/DL (ref ?–2)
WBC #/AREA URNS HPF: 1 /HPF (ref 0–6)

## 2018-06-12 PROCEDURE — 36415 COLL VENOUS BLD VENIPUNCTURE: CPT

## 2018-06-12 PROCEDURE — 81001 URINALYSIS AUTO W/SCOPE: CPT

## 2018-06-12 PROCEDURE — 80048 BASIC METABOLIC PNL TOTAL CA: CPT

## 2018-06-12 PROCEDURE — 70450 CT HEAD/BRAIN W/O DYE: CPT

## 2018-06-12 PROCEDURE — 85025 COMPLETE CBC W/AUTO DIFF WBC: CPT

## 2018-06-12 NOTE — CAT SCAN REPORT
FINAL REPORT



PROCEDURE:  CT HEAD/BRAIN WO CON



TECHNIQUE:  Computerized tomography of the head was performed

without contrast material. 



HISTORY:  Headache 



COMPARISON:  No prior studies are available for comparison.



FINDINGS:  

Brain: There is no evidence of intracranial hemorrhage.  No

parenchymal hemorrhage is seen.  No mass lesions or mass effect

is identified.  No abnormal extra-axial fluid collections or

masses are seen. Nonspecific scattered ossification of the falx

and tentorium are visualized. This is a finding of doubtful

significance. 



There is some decreased density seen in the periventricular white

matter without mass effect.  This is fairly symmetric and does

not exhibit any mass effect consistent with gliosis probably on

the basis of microvascular disease or white matter changes of

aging. 



Ventricles: The ventricles, sulcal pattern and fissures are

prominent consistent with atrophy. 



Bones: No evidence of acute fracture. Well-defined dense

homogeneous sclerotic nodule visualized right side of the frontal

bone anteriorly, lateral aspect right side of the forehead. This

has benign characteristics and appears represent a benign

osteoma.. 



Paranasal sinuses: Visualized portions are clear.



Mastoid air cells: Visualized portions are clear.



IMPRESSION:  

There is evidence of mild atrophy and gliosis. No acute

intracranial abnormalities are seen.



Nonspecific calcifications of the falx and tentorium are

visualized, this is a finding of doubtful significance.



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





No acute intracranial abnormalities are seen.

## 2018-07-08 ENCOUNTER — HOSPITAL ENCOUNTER (EMERGENCY)
Dept: HOSPITAL 5 - ED | Age: 83
Discharge: HOME | End: 2018-07-08
Payer: MEDICARE

## 2018-07-08 VITALS — SYSTOLIC BLOOD PRESSURE: 135 MMHG | DIASTOLIC BLOOD PRESSURE: 57 MMHG

## 2018-07-08 DIAGNOSIS — E03.9: ICD-10-CM

## 2018-07-08 DIAGNOSIS — G89.29: Primary | ICD-10-CM

## 2018-07-08 DIAGNOSIS — N18.9: ICD-10-CM

## 2018-07-08 DIAGNOSIS — I12.9: ICD-10-CM

## 2018-07-08 DIAGNOSIS — M19.90: ICD-10-CM

## 2018-07-08 DIAGNOSIS — Z90.89: ICD-10-CM

## 2018-07-08 DIAGNOSIS — Z88.5: ICD-10-CM

## 2018-07-08 DIAGNOSIS — G43.909: ICD-10-CM

## 2018-07-08 DIAGNOSIS — R68.84: ICD-10-CM

## 2018-07-08 PROCEDURE — 99283 EMERGENCY DEPT VISIT LOW MDM: CPT

## 2018-07-08 NOTE — EMERGENCY DEPARTMENT REPORT
ED ENT HPI





- General


Chief complaint: Pain General


Stated complaint: JAW PAIN


Time Seen by Provider: 07/08/18 05:12


Source: patient


Mode of arrival: Wheelchair


Limitations: No Limitations





- History of Present Illness


Initial comments: 


85-year-old female past medical history chronic TMJ pain, migraines, 

hypothyroid disorder, hyperlipidemia, hypertension, anxiety, CK D presents with 

complaint of acute on chronic bilateral jaw pain.  Patient states she follows 

up with pain management.


MD complaint: other (jaw pain)


-: year(s)


Location: other (b/l TMK joints)


Severity: moderate


Consistency: constant


Worsens with: medication





- Related Data


 Home Medications











 Medication  Instructions  Recorded  Confirmed  Last Taken


 


ALPRAZolam [Xanax TAB] 1 mg PO BID 03/04/15 08/23/17 05/11/17 02:00


 


Latanoprost 0.005% [Xalatan 0.005%] 1 drop OP QPM 03/04/15 08/23/17 05/10/17


 


Levothyroxine [Synthroid] 150 mcg PO QAM 03/04/15 08/23/17 05/10/17


 


Sennosides [Senna Laxative] 8.6 mg PO QDAY 03/04/15 08/23/17 05/10/17


 


Simvastatin 20 mg PO QHS 03/04/15 08/23/17 05/10/17


 


Losartan [Cozaar] 12.5 mg PO QDAY 01/09/17 08/23/17 05/11/17 02:00








 Previous Rx's











 Medication  Instructions  Recorded  Last Taken  Type


 


Acetaminophen [Acetaminophen TAB] 500 mg PO Q6HR PRN #25 tablet 01/07/17 

Unknown Rx


 


traMADol [Ultram 50 MG tab] 50 mg PO Q8HR PRN #20 tablet 08/23/17 Unknown Rx


 


ALPRAZolam [Xanax TAB] 1 mg PO BID PRN #4 tab 12/25/17 Unknown Rx


 


traMADol [Ultram 50 MG tab] 50 mg PO Q8H #6 tablet 12/25/17 Unknown Rx











 Allergies











Allergy/AdvReac Type Severity Reaction Status Date / Time


 


codeine AdvReac  Vomiting Verified 07/08/18 03:25


 


NSAIDS (Non-Steroidal AdvReac  Nausea Verified 07/08/18 03:25





Anti-Inflamma     














ED Dental HPI





- General


Chief complaint: Pain General


Stated complaint: JAW PAIN


Time Seen by Provider: 07/08/18 05:12


Source: patient


Mode of arrival: Wheelchair


Limitations: No Limitations





- Related Data


 Home Medications











 Medication  Instructions  Recorded  Confirmed  Last Taken


 


ALPRAZolam [Xanax TAB] 1 mg PO BID 03/04/15 08/23/17 05/11/17 02:00


 


Latanoprost 0.005% [Xalatan 0.005%] 1 drop OP QPM 03/04/15 08/23/17 05/10/17


 


Levothyroxine [Synthroid] 150 mcg PO QAM 03/04/15 08/23/17 05/10/17


 


Sennosides [Senna Laxative] 8.6 mg PO QDAY 03/04/15 08/23/17 05/10/17


 


Simvastatin 20 mg PO QHS 03/04/15 08/23/17 05/10/17


 


Losartan [Cozaar] 12.5 mg PO QDAY 01/09/17 08/23/17 05/11/17 02:00








 Previous Rx's











 Medication  Instructions  Recorded  Last Taken  Type


 


Acetaminophen [Acetaminophen TAB] 500 mg PO Q6HR PRN #25 tablet 01/07/17 

Unknown Rx


 


traMADol [Ultram 50 MG tab] 50 mg PO Q8HR PRN #20 tablet 08/23/17 Unknown Rx


 


ALPRAZolam [Xanax TAB] 1 mg PO BID PRN #4 tab 12/25/17 Unknown Rx


 


traMADol [Ultram 50 MG tab] 50 mg PO Q8H #6 tablet 12/25/17 Unknown Rx











 Allergies











Allergy/AdvReac Type Severity Reaction Status Date / Time


 


codeine AdvReac  Vomiting Verified 07/08/18 03:25


 


NSAIDS (Non-Steroidal AdvReac  Nausea Verified 07/08/18 03:25





Anti-Inflamma     














ED Review of Systems


ROS: 


Stated complaint: JAW PAIN


Other details as noted in HPI





Constitutional: denies: chills, fever


Eyes: denies: eye pain, eye discharge, vision change


ENT: as per HPI.  denies: ear pain, throat pain


Respiratory: denies: cough, shortness of breath, wheezing


Cardiovascular: denies: chest pain, palpitations


Endocrine: no symptoms reported


Gastrointestinal: denies: abdominal pain, nausea, diarrhea


Genitourinary: denies: urgency, dysuria, discharge


Musculoskeletal: denies: back pain, joint swelling, arthralgia


Skin: denies: rash, lesions


Neurological: denies: headache, weakness, paresthesias


Psychiatric: denies: anxiety, depression


Hematological/Lymphatic: denies: easy bleeding, easy bruising





ED Past Medical Hx





- Past Medical History


Hx Hypertension: Yes (x 10 yrs)


Hx Renal Disease: Yes (renal insufficiency)


Hx Arthritis: Yes (right arm, previous caryl knees)


Hx Headaches / Migraines: Yes (migraines)


Hx HIV: No


Additional medical history: glucoma, detached retina left eye, elevated 

cholesterol.  TMJ pain,hypothyroidism





- Surgical History


Additional Surgical History: HIATAL HERNIA, BILATERAL KNEE SURGERY, 

thyroidectomy,right wrist surgery/FX





- Social History


Smoking Status: Never Smoker


Substance Use Type: None





- Medications


Home Medications: 


 Home Medications











 Medication  Instructions  Recorded  Confirmed  Last Taken  Type


 


ALPRAZolam [Xanax TAB] 1 mg PO BID 03/04/15 08/23/17 05/11/17 02:00 History


 


Latanoprost 0.005% [Xalatan 0.005%] 1 drop OP QPM 03/04/15 08/23/17 05/10/17 

History


 


Levothyroxine [Synthroid] 150 mcg PO QAM 03/04/15 08/23/17 05/10/17 History


 


Sennosides [Senna Laxative] 8.6 mg PO QDAY 03/04/15 08/23/17 05/10/17 History


 


Simvastatin 20 mg PO QHS 03/04/15 08/23/17 05/10/17 History


 


Acetaminophen [Acetaminophen TAB] 500 mg PO Q6HR PRN #25 tablet 01/07/17 08/23/ 17 Unknown Rx


 


Losartan [Cozaar] 12.5 mg PO QDAY 01/09/17 08/23/17 05/11/17 02:00 History


 


traMADol [Ultram 50 MG tab] 50 mg PO Q8HR PRN #20 tablet 08/23/17  Unknown Rx


 


ALPRAZolam [Xanax TAB] 1 mg PO BID PRN #4 tab 12/25/17  Unknown Rx


 


traMADol [Ultram 50 MG tab] 50 mg PO Q8H #6 tablet 12/25/17  Unknown Rx














ED Physical Exam





- General


Limitations: No Limitations


General appearance: alert, in no apparent distress





- Head


Head exam: Present: atraumatic, normocephalic





- Eye


Eye exam: Present: normal appearance, PERRL, EOMI





- ENT


ENT exam: Present: mucous membranes moist, other





- Neck


Neck exam: Present: normal inspection





- Respiratory


Respiratory exam: Present: normal lung sounds bilaterally.  Absent: respiratory 

distress





- Cardiovascular


Cardiovascular Exam: Present: regular rate, normal rhythm.  Absent: systolic 

murmur, diastolic murmur, rubs, gallop





- GI/Abdominal


GI/Abdominal exam: Present: soft, normal bowel sounds





- Extremities Exam


Extremities exam: Present: normal inspection





- Back Exam


Back exam: Present: normal inspection





- Neurological Exam


Neurological exam: Present: alert, oriented X3





- Psychiatric


Psychiatric exam: Present: normal affect, normal mood





- Skin


Skin exam: Present: warm, dry, intact, normal color.  Absent: rash





ED Course


 Vital Signs











  07/08/18 07/08/18 07/08/18





  02:46 03:27 05:36


 


Temperature 98.2 F 98.2 F 


 


Pulse Rate 71 69 


 


Respiratory 18 18 16





Rate   


 


Blood Pressure 117/51 117/51 


 


Blood Pressure   





[Left]   


 


O2 Sat by Pulse 100 100 





Oximetry   














  07/08/18 07/08/18 07/08/18





  06:35 06:36 06:56


 


Temperature   98.7 F


 


Pulse Rate   71


 


Respiratory 16 16 16





Rate   


 


Blood Pressure   


 


Blood Pressure   135/57





[Left]   


 


O2 Sat by Pulse   97





Oximetry   














ED Medical Decision Making





- Medical Decision Making


A/P: Chronic jaw pain


1-patient states she has history of TMJ syndrome.  No signs of acute infection 

or jaw dislocation on exam


2-patient states that she follows up with primary care and pain management.  

Advised to continue following up with pain management. 


Critical care attestation.: 


If time is entered above; I have spent that time in minutes in the direct care 

of this critically ill patient, excluding procedure time.








ED Disposition


Clinical Impression: 


 Chronic jaw pain





Disposition: DC-01 TO HOME OR SELFCARE


Is pt being admited?: No


Does the pt Need Aspirin: No


Condition: Stable


Instructions:  Temporomandibular Disorder (ED)


Additional Instructions: 


Patient received 1 dose of [place] in ED.  As per my discussion with patient she 

wanted this written on her discharge packet.  Patient did not receive a 

prescription for narcotics during my encounter with her.  States she plans to 

follow-up with her primary care and pain management doctor on 7/9 or 7/10.  I 

advised patient she can take over-the-counter Tylenol but to avoid NSAIDs as 

she does have a history of chronic kidney disease.  I advised her to follow-up 

with her pain management specialist to adjust her chronic pain medications for 

TMJ syndrome.


Referrals: 


[facility] [place] [facility] [Provider Group] - 3-5 Days


ENT [facility] [Provider Group] - 3-5 Days


Time of Disposition: 06:39

## 2018-09-18 ENCOUNTER — HOSPITAL ENCOUNTER (EMERGENCY)
Dept: HOSPITAL 5 - ED | Age: 83
End: 2018-09-18
Payer: MEDICARE

## 2018-09-18 DIAGNOSIS — Z53.21: ICD-10-CM

## 2018-09-18 DIAGNOSIS — M79.1: Primary | ICD-10-CM

## 2022-02-01 NOTE — EMERGENCY DEPARTMENT REPORT
HPI





- General


Chief Complaint: Headache


Time Seen by Provider: 06/12/18 12:41





- HPI


HPI: 





85-year-old -American female presents to the emergency department via 

EMS from home with a complaint of a generalized headache appears to be an acute 

on chronic condition for her.  Patient was seen at [facility] a couple 

days ago and says that she had some blood work done and was given a shot of 

Toradol and it resolved her headache and she says that she did not have any CT 

imaging of the head done at that time.  She admits that she has been dealing 

with different headache issues for the past year and a half.  The patient 

currently is in pain management with a doctor pleasure and she has 

prescriptions of Norco and Xanax for her chronic pain and anxiety issues 

respectively.  She says that she took one of these Norco this morning without 

any relief.  She denies any vision change, slurred speech or any other 

neurological deficits.





ED Past Medical Hx





- Past Medical History


Hx Hypertension: Yes (x 10 yrs)


Hx Renal Disease: Yes (renal insufficiency)


Hx Arthritis: Yes (right arm, previous caryl knees)


Hx Headaches / Migraines: Yes (migraines)


Hx HIV: No


Additional medical history: glucoma, detached retina left eye, elevated 

cholesterol.  TMJ pain,hypothyroidism





- Surgical History


Additional Surgical History: HIATAL HERNIA, BILATERAL KNEE SURGERY, 

thyroidectomy,right wrist surgery/FX





- Social History


Smoking Status: Never Smoker


Substance Use Type: None





- Medications


Home Medications: 


 Home Medications











 Medication  Instructions  Recorded  Confirmed  Last Taken  Type


 


ALPRAZolam [Xanax TAB] 1 mg PO BID 03/04/15 08/23/17 05/11/17 02:00 History


 


Latanoprost 0.005% [Xalatan 0.005%] 1 drop OP QPM 03/04/15 08/23/17 05/10/17 

History


 


Levothyroxine [Synthroid] 150 mcg PO QAM 03/04/15 08/23/17 05/10/17 History


 


Sennosides [Senna Laxative] 8.6 mg PO QDAY 03/04/15 08/23/17 05/10/17 History


 


Simvastatin 20 mg PO QHS 03/04/15 08/23/17 05/10/17 History


 


Acetaminophen [Acetaminophen TAB] 500 mg PO Q6HR PRN #25 tablet 01/07/17 08/23/ 17 Unknown Rx


 


Losartan [Cozaar] 12.5 mg PO QDAY 01/09/17 08/23/17 05/11/17 02:00 History


 


traMADol [Ultram 50 MG tab] 50 mg PO Q8HR PRN #20 tablet 08/23/17  Unknown Rx


 


ALPRAZolam [Xanax TAB] 1 mg PO BID PRN #4 tab 12/25/17  Unknown Rx


 


traMADol [Ultram 50 MG tab] 50 mg PO Q8H #6 tablet 12/25/17  Unknown Rx














ED Review of Systems


ROS: 


Stated complaint: HEADACHE FOR 2 DAYS


Other details as noted in HPI





Comment: All other systems reviewed and negative


Constitutional: denies: chills, fever


Eyes: denies: eye pain, eye discharge, vision change


ENT: denies: ear pain, throat pain


Respiratory: denies: cough, shortness of breath, wheezing


Cardiovascular: denies: chest pain, palpitations


Gastrointestinal: denies: abdominal pain, nausea, diarrhea


Genitourinary: denies: urgency, dysuria, discharge


Musculoskeletal: denies: back pain, joint swelling, arthralgia


Skin: denies: rash, lesions


Neurological: headache.  denies: numbness





Physical Exam





- Physical Exam


Vital Signs: 


 Vital Signs











  06/12/18





  13:04


 


Temperature 98.4 F


 


Pulse Rate 86


 


Respiratory 18





Rate 


 


Blood Pressure 154/66





[Left] 


 


O2 Sat by Pulse 100





Oximetry 











Physical Exam: 


GENERAL: The patient is well-developed well-nourished.


HENT: Normocephalic.  Atraumatic.    Patient has moist mucous membranes.


EYES: Extraocular motions are intact.  Pupils equal reactive to light 

bilaterally.  No nystagmus.


NECK: Supple.  Trachea is midline.


CHEST/LUNGS: Clear to auscultation.  There is no respiratory distress noted.


HEART/CARDIOVASCULAR: Regular.  There is no tachycardia.  There is no murmur.


ABDOMEN: Abdomen is soft, nontender.  Patient has normal bowel sounds.  There 

is no abdominal distention.


SKIN: Skin is warm and dry.


NEURO: The patient is awake, alert, and oriented.  The patient is cooperative.  

The patient has no focal neurologic deficits.  The patient has normal speech.  

Cranial nerves II through XII grossly intact.


MUSCULOSKELETAL: There is no tenderness or deformity.  There is no limitation 

range of motion.  There is no evidence of acute injury.








ED Course


 Vital Signs











  06/12/18





  13:04


 


Temperature 98.4 F


 


Pulse Rate 86


 


Respiratory 18





Rate 


 


Blood Pressure 154/66





[Left] 


 


O2 Sat by Pulse 100





Oximetry 














ED Medical Decision Making





- Lab Data


Result diagrams: 


 06/12/18 13:02





 06/12/18 13:02





- Radiology Data


Radiology results: report reviewed





CT of the head does not show any acute intracranial process including no 

ischemia, shift, mass, bleeding or skull fracture.





- Medical Decision Making


Patient presents with a 2 to three-day history of a frontal headache.  There is 

no focal, motor or sensory deficits and her cranial nerves are intact.  A CT 

scan of the head without contrast was done that does not show any bleed, shift, 

mass or any other acute process.  Patient's labs were mostly unremarkable.  She 

did have some mildly elevated potassium level of 5.3 and was given a low dose 

of Kayexalate to correct this.  Vital signs stable throughout her ED course 

including being afebrile.  The patient had said that she has a history of these 

recurring and/or chronic headaches.  For this reason she has been given a 

referral for neurology.  I looked the patient up on the [place] prescription 

monitoring system and she does have access to Norco and Xanax for her chronic 

pains and anxiety and goes to a pain management doctor.  She was given 1 dose 

of a pain pill here and upon reevaluation she is feeling improved.  Because she 

took this medication, she is waiting for someone to come and get her and take 

responsibility for her from the emergency department.  She has been encouraged 

to follow up with her primary care physician and the referred neurologist, but 

to return to the emergency Department with any worsening of her symptoms or any 

acute distress.  She understands and agrees to the plan.





Prior to discharge, the patient was seen ambulatory in the emergency department 

and appeared stable while doing so.








- Differential Diagnosis


tension headache, migraine, cluster headache, brain bleed


Critical Care Time: No


Critical care attestation.: 


If time is entered above; I have spent that time in minutes in the direct care 

of this critically ill patient, excluding procedure time.








ED Disposition


Clinical Impression: 


 Hyperkalemia





Hypertension


Qualifiers:


 Hypertension type: essential hypertension Qualified Code(s): I10 - Essential (

primary) hypertension





Headache


Qualifiers:


 Headache type: unspecified Headache chronicity pattern: episodic headache 

Intractability: not intractable Qualified Code(s): R51 - Headache





Disposition: DC-01 TO HOME OR SELFCARE


Is pt being admited?: No


Condition: Stable


Instructions:  Hyperkalemia (ED), Acute Headache (ED), Hypertension (ED)


Additional Instructions: 


Please follow-up with your primary care physician regarding your headache, 

slightly elevated potassium level and your elevated blood pressure.  Return to 

the emergency Department with any worsening of your symptoms or any acute 

distress.  Since you mentioned that you have a history of these recurrent 

headaches, I have given her a referral for a local neurologist, Dr. Lu. 


Referrals: 


PRIMARY CARE,MD [Primary Care Provider] - HIRAL MALAVE MD [Staff Physician] - 3-5 Days


Time of Disposition: 15:39 (4) no limitation